# Patient Record
Sex: MALE | Race: WHITE | HISPANIC OR LATINO | URBAN - METROPOLITAN AREA
[De-identification: names, ages, dates, MRNs, and addresses within clinical notes are randomized per-mention and may not be internally consistent; named-entity substitution may affect disease eponyms.]

---

## 2022-09-12 ENCOUNTER — TELEPHONE (OUTPATIENT)
Dept: OTHER | Facility: OTHER | Age: 37
End: 2022-09-12

## 2022-09-12 NOTE — TELEPHONE ENCOUNTER
Patient is looking to cancel his appointment tomorrow  I attempted to do it, but someone has the chart open, and the system did not allow me to do so

## 2022-09-12 NOTE — TELEPHONE ENCOUNTER
Patient is looking to establish an appointment as a new patient  He would be a self pay, as he does not have insurance, but is concerned about his family history of liver cancer

## 2022-09-16 ENCOUNTER — TELEPHONE (OUTPATIENT)
Dept: GASTROENTEROLOGY | Facility: CLINIC | Age: 37
End: 2022-09-16

## 2022-09-21 ENCOUNTER — OFFICE VISIT (OUTPATIENT)
Dept: GASTROENTEROLOGY | Facility: CLINIC | Age: 37
End: 2022-09-21

## 2022-09-21 VITALS
WEIGHT: 155 LBS | SYSTOLIC BLOOD PRESSURE: 118 MMHG | BODY MASS INDEX: 24.91 KG/M2 | DIASTOLIC BLOOD PRESSURE: 80 MMHG | HEIGHT: 66 IN | TEMPERATURE: 98.8 F

## 2022-09-21 DIAGNOSIS — Z80.0 FAMILY HISTORY OF LIVER CANCER: ICD-10-CM

## 2022-09-21 DIAGNOSIS — R68.81 EARLY SATIETY: Primary | ICD-10-CM

## 2022-09-21 PROCEDURE — 99244 OFF/OP CNSLTJ NEW/EST MOD 40: CPT | Performed by: INTERNAL MEDICINE

## 2022-09-21 NOTE — ASSESSMENT & PLAN NOTE
Symptoms ongoing for the last couple of months  Fortunately, has been able to gain weight  Denies any nausea or vomiting  No diabetes history  Possible idiopathic gastroparesis  No other dyspeptic symptoms    · Check gastric emptying study  · Depending on findings, can consider upper endoscopy for further evaluation  · Follow-up in 3 months

## 2022-09-21 NOTE — ASSESSMENT & PLAN NOTE
Family history of liver cancer in his father diagnosed at the age of 59  Patient also reports paternal uncle also passed away from liver cancer  Neither of his family members were excessive drinkers  No alarming symptoms at this time apart from early satiety as noted above  Patient consumes alcohol only on occasional/seldom basis  Denies nicotine use  No clinical or laboratory stigmata of chronic liver disease    · No clear indication for screening at this time  · Had discussion with patient regarding his familial history as well as the lack of any alarming symptoms at this time; patient verbalized understanding   · Will proceed with gastric emptying study for early satiety and consideration for possible upper endoscopy depending on findings  · Can consider imaging with ultrasound of the abdomen versus CT with IV contrast in the future if patient develops any other symptoms  · Follow-up in 3 months; can discuss further with patient at that time

## 2022-09-21 NOTE — PROGRESS NOTES
[facility] - Outpatient Consultation  Linda Marcus 40 y o  male MRN: [id number]  Encounter: [id number]      ASSESSMENT & PLAN:      Problem List Items Addressed This Visit        Other    Early satiety - Primary     Symptoms ongoing for the last couple of months  Fortunately, has been able to gain weight  Denies any nausea or vomiting  No diabetes history  Possible idiopathic gastroparesis  No other dyspeptic symptoms  · Check gastric emptying study  · Depending on findings, can consider upper endoscopy for further evaluation  · Follow-up in 3 months         Relevant Orders    NM gastric emptying    Family history of liver cancer     Family history of liver cancer in his father diagnosed at the age of 59  Patient also reports paternal uncle also passed away from liver cancer  Neither of his family members were excessive drinkers  No alarming symptoms at this time apart from early satiety as noted above  Patient consumes alcohol only on occasional/seldom basis  Denies nicotine use  No clinical or laboratory stigmata of chronic liver disease  · No clear indication for screening at this time  · Had discussion with patient regarding his familial history as well as the lack of any alarming symptoms at this time; patient verbalized understanding   · Will proceed with gastric emptying study for early satiety and consideration for possible upper endoscopy depending on findings  · Can consider imaging with ultrasound of the abdomen versus CT with IV contrast in the future if patient develops any other symptoms  · Follow-up in 3 months; can discuss further with patient at that time               Orders Placed This Encounter   Procedures    NM gastric emptying     Standing Status:   Future     Standing Expiration Date:   9/21/2026     Scheduling Instructions:      THIS IS A 4 HOUR TEST    No food or drink from midnight the night before the exam  If you are diabetic and take medication please bring it with you  Please bring your insurance cards, a form of photo ID and alist of your medications with you  Arrive 15 minutes prior to your appointment time in order to register  On the day of your test, please bring any prior studies of this area with you that were not performed at a [facility]  To schedule this appointment, please contact Central Scheduling at [phone]  Order Specific Question:   Reason for Exam (FREE TEXT)     Answer:   Early satiety     Order Specific Question:   When should the test be performed? Answer:   in 1 week       Follow-up: 3 months  ______________________________________________________________________    HPI:  32-year-old male presents to GI office for reports of early satiety and a family history of liver cancer which he is concerned about  Utilized VTM [place]ian  for encounter as patient is [place]oan-[place]ian speaking  Patient reports that over the last couple of months, he has felt full very quickly after eating only small meals  Patient states any kidney just a couple of bites of something and this will cause him to be full very quickly  He denies any nausea or vomiting and states that he has gained 5 kg  He denies any other alarm symptoms including dysphagia or odynophagia, abdominal pain, hematochezia, melena, change in bowel habits  He does not have a diagnosis of diabetes  Additionally, patient reports a family history of liver cancer  He states that his father was diagnosed with liver cancer at the age of 59  He also reports a history of liver cancer in his paternal uncle as well as kidney cancer in his paternal aunt  He states that neither his uncle nor his father were excessive drinker is and had no prior diagnosis of chronic liver disease  The patient himself does drink alcohol but only seldom on occasion  He denies smoking or prior tobacco use  No other alarm symptoms      Last endoscopy: None  Last colonoscopy: None    REVIEW OF SYSTEMS:    CONSTITUTIONAL: Denies any fever, chills, rigors, and weight loss  HEENT: No earache or tinnitus, denies hearing loss or visual disturbances  CARDIOVASCULAR: No chest pain or palpitations  RESPIRATORY: Denies any cough, hemoptysis, shortness of breath or dyspnea on exertion  GASTROINTESTINAL: As noted in the History of Present Illness  GENITOURINARY: No problems with urination, denies any hematuria or dysuria  NEUROLOGIC: No dizziness or vertigo, denies headaches   MUSCULOSKELETAL: Denies any muscle or joint pain   SKIN: Denies skin rashes or itching  ENDOCRINE: Denies excessive thirst, denies intolerance to heat or cold  PSYCHOSOCIAL: Denies depression or anxiety, denies any recent memory loss     Historical Information   History reviewed  No pertinent past medical history  History reviewed  No pertinent surgical history  Social History   Social History     Substance and Sexual Activity   Alcohol Use Never     Social History     Substance and Sexual Activity   Drug Use Never     Social History     Tobacco Use   Smoking Status Never Smoker   Smokeless Tobacco Never Used     Family History   Problem Relation Age of Onset    Liver cancer Father        MEDICATIONS & ALLERGIES:    No current outpatient medications  No Known Allergies    PHYSICAL EXAM:      Objective   Blood pressure 118/80, temperature 98 8 °F (37 1 °C), temperature source Tympanic, height 5' 6" (1 676 m), weight 70 3 kg (155 lb)  Body mass index is 25 02 kg/m²  General Appearance:   Alert, cooperative, no distress   HEENT:   Normocephalic, atraumatic, anicteric     Neck:   Supple, symmetrical, trachea midline   Lungs:   Equal chest rise, respirations unlabored    Heart:   Regular rate and rhythm   Abdomen:   Soft, non-tender, non-distended; normal bowel sounds; no masses, no organomegaly    Rectal:   Deferred    Extremities:   No cyanosis, clubbing or edema    Neuro:    Moves all 4 extremities    Skin:   No jaundice, rashes, or lesions      LAB RESULTS:     No visits with results within 1 Day(s) from this visit  Latest known visit with results is:   No results found for any previous visit  RADIOLOGY RESULTS: I have personally reviewed pertinent imaging studies  JOEL Gifford  Chief Gastroenterology Fellow  [facility]  Division of Gastroenterology and Hepatology  Available on [email or web address]

## 2022-09-21 NOTE — PATIENT INSTRUCTIONS
We will check a gastric emptying study  Depending on the results, we can consider doing an endoscopy  We can also discuss getting an abdominal US or CT scan given your family history of liver cancer

## 2023-02-03 ENCOUNTER — OFFICE VISIT (OUTPATIENT)
Dept: INTERNAL MEDICINE CLINIC | Facility: CLINIC | Age: 38
End: 2023-02-03

## 2023-02-03 VITALS
TEMPERATURE: 99.3 F | OXYGEN SATURATION: 97 % | HEIGHT: 66 IN | BODY MASS INDEX: 24.59 KG/M2 | HEART RATE: 106 BPM | SYSTOLIC BLOOD PRESSURE: 128 MMHG | WEIGHT: 153 LBS | DIASTOLIC BLOOD PRESSURE: 86 MMHG

## 2023-02-03 DIAGNOSIS — K64.8 INTERNAL HEMORRHOIDS: Primary | ICD-10-CM

## 2023-02-03 NOTE — PROGRESS NOTES
Assessment/Plan:  Internal hemorrhoids       There are no diagnoses linked to this encounter  Subjective:   Rectal  pain   Patient ID: Kailey Ramirez is a 40 y o  male  HPI    The following portions of the patient's history were reviewed and updated as appropriate: allergies, current medications, past family history, past medical history, past social history, past surgical history, and problem list     Review of Systems   Constitutional: Negative  HENT: Negative for dental problem, drooling, ear discharge and ear pain  Eyes: Negative for discharge, redness and itching  Respiratory: Negative for apnea, cough and wheezing  Cardiovascular: Negative for chest pain and palpitations  Gastrointestinal: Positive for rectal pain  Negative for abdominal pain, blood in stool, diarrhea and vomiting  Endocrine: Negative for polydipsia, polyphagia and polyuria  Genitourinary: Negative for decreased urine volume, dysuria and frequency  Musculoskeletal: Negative for arthralgias, myalgias and neck stiffness  Skin: Negative for pallor and wound  Allergic/Immunologic: Negative for environmental allergies and food allergies  Neurological: Negative for facial asymmetry, light-headedness, numbness and headaches  Hematological: Negative for adenopathy  Does not bruise/bleed easily  Psychiatric/Behavioral: Negative for agitation, behavioral problems and confusion  Objective:      /86 (BP Location: Left arm, Patient Position: Sitting, Cuff Size: Large)   Pulse (!) 106   Temp 99 3 °F (37 4 °C) (Temporal)   Ht 5' 6" (1 676 m)   Wt 69 4 kg (153 lb)   SpO2 97% Comment: RA  BMI 24 69 kg/m²          Physical Exam  Constitutional:       Appearance: Normal appearance  He is obese  HENT:      Head: Normocephalic  Nose: Nose normal       Mouth/Throat:      Mouth: Mucous membranes are moist    Eyes:      Pupils: Pupils are equal, round, and reactive to light     Cardiovascular:      Rate and Rhythm: Regular rhythm  Heart sounds: Normal heart sounds  Pulmonary:      Breath sounds: Normal breath sounds  Abdominal:      Palpations: Abdomen is soft  Musculoskeletal:         General: No swelling  Cervical back: Neck supple  Skin:     General: Skin is warm  Neurological:      General: No focal deficit present  Mental Status: He is alert and oriented to person, place, and time  Psychiatric:         Mood and Affect: Mood normal        internal hemorrhoids  Internal  Hemorrhoids    Referred  To  See Rectal  And  Colon  lizet      Fup  Prferdinand Melendez MD

## 2023-03-08 ENCOUNTER — APPOINTMENT (EMERGENCY)
Dept: RADIOLOGY | Facility: HOSPITAL | Age: 38
End: 2023-03-08

## 2023-03-08 ENCOUNTER — HOSPITAL ENCOUNTER (EMERGENCY)
Facility: HOSPITAL | Age: 38
Discharge: HOME | End: 2023-03-08
Attending: EMERGENCY MEDICINE | Admitting: EMERGENCY MEDICINE

## 2023-03-08 VITALS
BODY MASS INDEX: 24.21 KG/M2 | OXYGEN SATURATION: 97 % | TEMPERATURE: 97.5 F | HEART RATE: 69 BPM | SYSTOLIC BLOOD PRESSURE: 151 MMHG | WEIGHT: 150 LBS | DIASTOLIC BLOOD PRESSURE: 106 MMHG | RESPIRATION RATE: 22 BRPM

## 2023-03-08 DIAGNOSIS — R07.9 CHEST PAIN: Primary | ICD-10-CM

## 2023-03-08 LAB
ANION GAP SERPL CALCULATED.3IONS-SCNC: 3 MMOL/L (ref 4–13)
ATRIAL RATE: 72 BPM
BASOPHILS # BLD AUTO: 0.07 THOUSANDS/ÂΜL (ref 0–0.1)
BASOPHILS NFR BLD AUTO: 1 % (ref 0–1)
BUN SERPL-MCNC: 12 MG/DL (ref 5–25)
CALCIUM SERPL-MCNC: 9.6 MG/DL (ref 8.3–10.1)
CARDIAC TROPONIN I PNL SERPL HS: 2 NG/L
CHLORIDE SERPL-SCNC: 109 MMOL/L (ref 96–108)
CO2 SERPL-SCNC: 26 MMOL/L (ref 21–32)
CREAT SERPL-MCNC: 0.87 MG/DL (ref 0.6–1.3)
EOSINOPHIL # BLD AUTO: 0.5 THOUSAND/ÂΜL (ref 0–0.61)
EOSINOPHIL NFR BLD AUTO: 7 % (ref 0–6)
ERYTHROCYTE [DISTWIDTH] IN BLOOD BY AUTOMATED COUNT: 13.3 % (ref 11.6–15.1)
GFR SERPL CREATININE-BSD FRML MDRD: 111 ML/MIN/1.73SQ M
GLUCOSE SERPL-MCNC: 124 MG/DL (ref 65–140)
HCT VFR BLD AUTO: 44.2 % (ref 36.5–49.3)
HGB BLD-MCNC: 15.5 G/DL (ref 12–17)
IMM GRANULOCYTES # BLD AUTO: 0.03 THOUSAND/UL (ref 0–0.2)
IMM GRANULOCYTES NFR BLD AUTO: 0 % (ref 0–2)
LYMPHOCYTES # BLD AUTO: 1.68 THOUSANDS/ÂΜL (ref 0.6–4.47)
LYMPHOCYTES NFR BLD AUTO: 23 % (ref 14–44)
MCH RBC QN AUTO: 28.5 PG (ref 26.8–34.3)
MCHC RBC AUTO-ENTMCNC: 35.1 G/DL (ref 31.4–37.4)
MCV RBC AUTO: 81 FL (ref 82–98)
MONOCYTES # BLD AUTO: 0.61 THOUSAND/ÂΜL (ref 0.17–1.22)
MONOCYTES NFR BLD AUTO: 8 % (ref 4–12)
NEUTROPHILS # BLD AUTO: 4.36 THOUSANDS/ÂΜL (ref 1.85–7.62)
NEUTS SEG NFR BLD AUTO: 61 % (ref 43–75)
NRBC BLD AUTO-RTO: 0 /100 WBCS
P AXIS: 55 DEGREES
PLATELET # BLD AUTO: 320 THOUSANDS/UL (ref 149–390)
PMV BLD AUTO: 9.9 FL (ref 8.9–12.7)
POTASSIUM SERPL-SCNC: 3.6 MMOL/L (ref 3.5–5.3)
PR INTERVAL: 134 MS
QRS AXIS: 65 DEGREES
QRSD INTERVAL: 94 MS
QT INTERVAL: 354 MS
QTC INTERVAL: 387 MS
RBC # BLD AUTO: 5.43 MILLION/UL (ref 3.88–5.62)
SODIUM SERPL-SCNC: 138 MMOL/L (ref 135–147)
T WAVE AXIS: 25 DEGREES
VENTRICULAR RATE: 72 BPM
WBC # BLD AUTO: 7.25 THOUSAND/UL (ref 4.31–10.16)

## 2023-03-08 RX ORDER — KETOROLAC TROMETHAMINE 30 MG/ML
15 INJECTION, SOLUTION INTRAMUSCULAR; INTRAVENOUS ONCE
Status: COMPLETED | OUTPATIENT
Start: 2023-03-08 | End: 2023-03-08

## 2023-03-08 RX ORDER — KETOROLAC TROMETHAMINE 30 MG/ML
15 INJECTION, SOLUTION INTRAMUSCULAR; INTRAVENOUS ONCE
Status: DISCONTINUED | OUTPATIENT
Start: 2023-03-08 | End: 2023-03-08

## 2023-03-08 RX ORDER — ACETAMINOPHEN 325 MG/1
650 TABLET ORAL ONCE
Status: COMPLETED | OUTPATIENT
Start: 2023-03-08 | End: 2023-03-08

## 2023-03-08 RX ADMIN — ACETAMINOPHEN 650 MG: 325 TABLET ORAL at 14:00

## 2023-03-08 RX ADMIN — KETOROLAC TROMETHAMINE 15 MG: 30 INJECTION, SOLUTION INTRAMUSCULAR; INTRAVENOUS at 14:00

## 2023-03-08 NOTE — PROGRESS NOTES
There are no diagnoses linked to this encounter  Pruritus ani  Patient presents for evaluation of perianal concerns  Patient is [place] speaking and  line is used for translation  Patient notes for the past few weeks he has had anorectal discomfort described as burning or itching  He had 1 episode of bleeding at the beginning of this  Since then he has had perianal discomfort  Examination shows perianal skin maceration and grade 1 internal hemorrhoids  Given his symptoms, I believe that is most reasonable to treat him for pruritus  I have offered topical Calmoseptine  He has had only 1 episode of bleeding and none since  Observation is reasonable for this  If he has recurrent bleeding he will call and we will schedule evaluation or colonoscopy  If persistent symptoms or new concerns he will call for reevaluation as well  HPI     Cori Galvin is here today for evaluation of hemorrhoidal symptoms  The patient reports a burning anal pain and itching and an anal lump since 2 months ago, which he notes seem to be improving over time  He notes an episode of bright red rectal bleeding with bowel movement on the toilet bowl around 2 months ago  He has 1 bowel every 2 days, varying from hard to soft and formed    The patient has no prior colonoscopy  No past medical history on file  No past surgical history on file  No current outpatient medications on file  Allergies as of 03/09/2023   • (No Known Allergies)     Review of Systems   Gastrointestinal: Positive for rectal pain  All other systems reviewed and are negative  There were no vitals filed for this visit  Physical Exam  Constitutional:       Appearance: Normal appearance  HENT:      Head: Normocephalic and atraumatic  Mouth/Throat:      Mouth: Mucous membranes are moist    Eyes:      Pupils: Pupils are equal, round, and reactive to light     Pulmonary:      Effort: Pulmonary effort is normal    Skin: General: Skin is warm and dry  Neurological:      General: No focal deficit present  Mental Status: He is alert and oriented to person, place, and time  Psychiatric:         Mood and Affect: Mood normal          Behavior: Behavior normal          Thought Content: Thought content normal          Judgment: Judgment normal      Lower Endoscopy    Date/Time: 3/9/2023 4:51 PM  Performed by: Argenis Weems MD  Authorized by: Argenis Weems MD     Verbal consent obtained?: Yes    Risks and benefits: Risks, benefits and alternatives were discussed    Consent given by:  Patient  Indications: rectal irritation    Patient sedated: No    Scope type:   Anoscope  External exam performed: Yes    Pilonidal sinus tract: No    Pilonidal cyst: No    Pilonidal tenderness: No    Perianal skin tags: No    Perirectal warts: No    Perianal maceration: Yes    Perianal induration: No    Perianal erythema: No    External hemorrhoids: No    Digital exam performed: Yes    Laxity of anal sphincter: No    Prostate tenderness: No    Internal hemorrhoids: Yes    Prolapsed: No    Intraluminal mass: No    Inflammation: No    Anal fissures: No    Anal fistulae: No    Anal stricture: No    Abscess: No    Procedure termination:  Procedure complete  Patient tolerance:  Patient tolerated the procedure well with no immediate complications

## 2023-03-08 NOTE — ED PROVIDER NOTES
History  Chief Complaint   Patient presents with   • Chest Pain     Pt c/o exertional chest pain and shortness of breath, increasing over the last week  HPI  Patient is a 50-year-old male with no past medical history presenting with left-sided chest pain  Patient states that symptoms have been ongoing for past several years however states that it has worsened since Wednesday  Pain is constant and sometimes radiates to the left arm or left neck  Patient describes the pain as 8 out of 10  Patient also complains that the pain is pleuritic  Pain also is reproduced with exertion  Patient has been taking Tylenol but nothing was taken today  Pain is also not p o  intake related  When sitting still patient complains of no shortness of breath  Denies any fever, chills, nausea, vomiting, lightheadedness, palpitations, weakness, numbness  Patient denies any smoking history and denies any IV drug use  No other associated symptoms including diaphoresis  None       No past medical history on file  No past surgical history on file  Family History   Problem Relation Age of Onset   • Liver cancer Father      I have reviewed and agree with the history as documented  E-Cigarette/Vaping     E-Cigarette/Vaping Substances     Social History     Tobacco Use   • Smoking status: Never   • Smokeless tobacco: Never   Substance Use Topics   • Alcohol use: Never   • Drug use: Never        Review of Systems   Constitutional: Negative for chills, diaphoresis, fever and unexpected weight change  HENT: Negative for ear pain and sore throat  Eyes: Negative for visual disturbance  Respiratory: Negative for cough, chest tightness and shortness of breath  Cardiovascular: Positive for chest pain  Negative for leg swelling  Gastrointestinal: Negative for abdominal distention, abdominal pain, constipation, diarrhea, nausea and vomiting  Endocrine: Negative      Genitourinary: Negative for difficulty urinating and dysuria  Musculoskeletal: Negative  Skin: Negative  Allergic/Immunologic: Negative  Neurological: Negative  Hematological: Negative  Psychiatric/Behavioral: Negative  All other systems reviewed and are negative  Physical Exam  ED Triage Vitals [03/08/23 1111]   Temperature Pulse Respirations Blood Pressure SpO2   97 5 °F (36 4 °C) 69 22 (!) 151/106 97 %      Temp Source Heart Rate Source Patient Position - Orthostatic VS BP Location FiO2 (%)   Temporal Monitor Lying Left arm --      Pain Score       8             Orthostatic Vital Signs  Vitals:    03/08/23 1111   BP: (!) 151/106   Pulse: 69   Patient Position - Orthostatic VS: Lying       Physical Exam  Vitals and nursing note reviewed  Constitutional:       General: He is not in acute distress  Appearance: Normal appearance  He is not ill-appearing  HENT:      Head: Normocephalic and atraumatic  Right Ear: External ear normal       Left Ear: External ear normal       Nose: Nose normal       Mouth/Throat:      Mouth: Mucous membranes are moist       Pharynx: Oropharynx is clear  Eyes:      General: No scleral icterus  Right eye: No discharge  Left eye: No discharge  Extraocular Movements: Extraocular movements intact  Conjunctiva/sclera: Conjunctivae normal       Pupils: Pupils are equal, round, and reactive to light  Cardiovascular:      Rate and Rhythm: Normal rate and regular rhythm  Pulses: Normal pulses  Heart sounds: Normal heart sounds  Pulmonary:      Effort: Pulmonary effort is normal       Breath sounds: Normal breath sounds  Abdominal:      General: Abdomen is flat  Bowel sounds are normal  There is no distension  Palpations: Abdomen is soft  Tenderness: There is no abdominal tenderness  There is no guarding or rebound  Musculoskeletal:         General: Normal range of motion  Cervical back: Normal range of motion and neck supple     Skin:     General: Skin is warm and dry  Capillary Refill: Capillary refill takes less than 2 seconds  Neurological:      General: No focal deficit present  Mental Status: He is alert and oriented to person, place, and time  Mental status is at baseline  Psychiatric:         Mood and Affect: Mood normal          Behavior: Behavior normal          Thought Content:  Thought content normal          Judgment: Judgment normal          ED Medications  Medications   acetaminophen (TYLENOL) tablet 650 mg (650 mg Oral Given 3/8/23 1400)   ketorolac (TORADOL) injection 15 mg (15 mg Intravenous Given 3/8/23 1400)       Diagnostic Studies  Results Reviewed     Procedure Component Value Units Date/Time    HS Troponin 0hr (reflex protocol) [[id number]]  (Normal) Collected: 03/08/23 1221    Lab Status: Final result Specimen: Blood from Arm, Left Updated: 03/08/23 1327     hs TnI 0hr 2 ng/L     Basic metabolic panel [[id number]]  (Abnormal) Collected: 03/08/23 1221    Lab Status: Final result Specimen: Blood from Arm, Left Updated: 03/08/23 1251     Sodium 138 mmol/L      Potassium 3 6 mmol/L      Chloride 109 mmol/L      CO2 26 mmol/L      ANION GAP 3 mmol/L      BUN 12 mg/dL      Creatinine 0 87 mg/dL      Glucose 124 mg/dL      Calcium 9 6 mg/dL      eGFR 111 ml/min/1 73sq m     Narrative:      [place] guidelines for Chronic Kidney Disease (CKD):   •  Stage 1 with normal or high GFR (GFR > 90 mL/min/1 73 square meters)  •  Stage 2 Mild CKD (GFR = 60-89 mL/min/1 73 square meters)  •  Stage 3A Moderate CKD (GFR = 45-59 mL/min/1 73 square meters)  •  Stage 3B Moderate CKD (GFR = 30-44 mL/min/1 73 square meters)  •  Stage 4 Severe CKD (GFR = 15-29 mL/min/1 73 square meters)  •  Stage 5 End Stage CKD (GFR <15 mL/min/1 73 square meters)  Note: GFR calculation is accurate only with a steady state creatinine    CBC and differential [[id number]]  (Abnormal) Collected: 03/08/23 1221    Lab Status: Final result Specimen: Blood from Arm, Left Updated: 03/08/23 1232     WBC 7 25 Thousand/uL      RBC 5 43 Million/uL      Hemoglobin 15 5 g/dL      Hematocrit 44 2 %      MCV 81 fL      MCH 28 5 pg      MCHC 35 1 g/dL      RDW 13 3 %      MPV 9 9 fL      Platelets 575 Thousands/uL      nRBC 0 /100 WBCs      Neutrophils Relative 61 %      Immat GRANS % 0 %      Lymphocytes Relative 23 %      Monocytes Relative 8 %      Eosinophils Relative 7 %      Basophils Relative 1 %      Neutrophils Absolute 4 36 Thousands/µL      Immature Grans Absolute 0 03 Thousand/uL      Lymphocytes Absolute 1 68 Thousands/µL      Monocytes Absolute 0 61 Thousand/µL      Eosinophils Absolute 0 50 Thousand/µL      Basophils Absolute 0 07 Thousands/µL                  XR chest 1 view portable   Final Result by Trini Mayorga MD (03/08 1420)      No acute cardiopulmonary disease  Findings are stable            Workstation performed: [id number]               Procedures  Procedures      ED Course             HEART Risk Score    Flowsheet Row Most Recent Value   Heart Score Risk Calculator    History 0 Filed at: 03/08/2023 1349   ECG 0 Filed at: 03/08/2023 1349   Age 0 Filed at: 03/08/2023 1349   Risk Factors 0 Filed at: 03/08/2023 1349   Troponin 0 Filed at: 03/08/2023 1349   HEART Score 0 Filed at: 03/08/2023 1349                      SBIRT 22yo+    Flowsheet Row Most Recent Value   SBIRT (23 yo +)    In order to provide better care to our patients, we are screening all of our patients for alcohol and drug use  Would it be okay to ask you these screening questions? No Filed at: 03/08/2023 1122                Medical Decision Making  Patient is a 15-year-old male presenting with chest pain  Differential includes pneumothorax, pneumonia, ACS, GERD, costochondritis  We will obtain cardiac work-up  Cardiac work-up was negative    X-ray with no cardiopulmonary disease noted  Patient discharged with instructions to establish care with Dr James Marcos  Return precautions provided    Amount and/or Complexity of Data Reviewed  Labs: ordered  Radiology: ordered  Risk  OTC drugs  Prescription drug management  Disposition  Final diagnoses:   Chest pain     Time reflects when diagnosis was documented in both MDM as applicable and the Disposition within this note     Time User Action Codes Description Comment    3/8/2023  1:49 PM Iliana Lei Add [R07 9] Chest pain       ED Disposition     ED Disposition   Discharge    Condition   Stable    Date/Time   Wed Mar 8, 2023  1:47 PM    Comment   [address] discharge to home/self care  Follow-up Information     Follow up With Specialties Details Why Contact Info Additional Information    Richard Yeh MD Internal Medicine Schedule an appointment as soon as possible for a visit   [address] [facility]  [address] [facility] Emergency Department Emergency Medicine Go to  If symptoms worsen [address] Emergency Department, [address] [place] [address], [id number] [phone]          There are no discharge medications for this patient  No discharge procedures on file  PDMP Review     None           ED Provider  Attending physically available and evaluated [address]  I managed the patient along with the ED Attending      Electronically Signed by         Rosaura Bear MD  03/08/23 2013

## 2023-03-08 NOTE — ED ATTENDING ATTESTATION
3/8/2023  IMiguelangel DO, saw and evaluated the patient  I have discussed the patient with the resident/non-physician practitioner and agree with the resident's/non-physician practitioner's findings, Plan of Care, and MDM as documented in the resident's/non-physician practitioner's note, except where noted  All available labs and Radiology studies were reviewed  I was present for key portions of any procedure(s) performed by the resident/non-physician practitioner and I was immediately available to provide assistance  At this point I agree with the current assessment done in the Emergency Department  I have conducted an independent evaluation of this patient a history and physical is as follows:    47yo male presents with chest pain that started several years ago but worse over the past 1 week  Denies SOB, no nausea, no diaphoresis  No known medical problems  On exam - nad, heart reg, lungs clear    Plan - check cardiac labs and reassess    ED Course         Critical Care Time  Procedures

## 2023-03-09 ENCOUNTER — OFFICE VISIT (OUTPATIENT)
Age: 38
End: 2023-03-09

## 2023-03-09 VITALS — HEIGHT: 66 IN | BODY MASS INDEX: 24.11 KG/M2 | WEIGHT: 150 LBS

## 2023-03-09 DIAGNOSIS — L29.0 PRURITUS ANI: Primary | ICD-10-CM

## 2023-03-09 NOTE — ASSESSMENT & PLAN NOTE
Patient presents for evaluation of perianal concerns  Patient is [place] speaking and  line is used for translation  Patient notes for the past few weeks he has had anorectal discomfort described as burning or itching  He had 1 episode of bleeding at the beginning of this  Since then he has had perianal discomfort  Examination shows perianal skin maceration and grade 1 internal hemorrhoids  Given his symptoms, I believe that is most reasonable to treat him for pruritus  I have offered topical Calmoseptine  He has had only 1 episode of bleeding and none since  Observation is reasonable for this  If he has recurrent bleeding he will call and we will schedule evaluation or colonoscopy  If persistent symptoms or new concerns he will call for reevaluation as well

## 2023-09-26 ENCOUNTER — TELEPHONE (OUTPATIENT)
Age: 38
End: 2023-09-26

## 2023-09-26 NOTE — TELEPHONE ENCOUNTER
Left patient voicemail with estimate for upcoming appointment with Dr. Lydia Estrada. Patient is self pay.

## 2024-06-27 ENCOUNTER — HOSPITAL ENCOUNTER (EMERGENCY)
Facility: HOSPITAL | Age: 39
Discharge: HOME | End: 2024-06-27
Attending: EMERGENCY MEDICINE

## 2024-06-27 ENCOUNTER — APPOINTMENT (EMERGENCY)
Dept: RADIOLOGY | Facility: HOSPITAL | Age: 39
End: 2024-06-27

## 2024-06-27 VITALS
HEART RATE: 61 BPM | RESPIRATION RATE: 16 BRPM | OXYGEN SATURATION: 99 % | WEIGHT: 159 LBS | TEMPERATURE: 99 F | SYSTOLIC BLOOD PRESSURE: 123 MMHG | BODY MASS INDEX: 25.66 KG/M2 | DIASTOLIC BLOOD PRESSURE: 58 MMHG

## 2024-06-27 DIAGNOSIS — R10.9 ABDOMINAL PAIN: ICD-10-CM

## 2024-06-27 DIAGNOSIS — K59.00 CONSTIPATION: Primary | ICD-10-CM

## 2024-06-27 LAB
ALBUMIN SERPL BCG-MCNC: 4.7 G/DL (ref 3.5–5)
ALP SERPL-CCNC: 93 U/L (ref 34–104)
ALT SERPL W P-5'-P-CCNC: 17 U/L (ref 7–52)
ANION GAP SERPL CALCULATED.3IONS-SCNC: 12 MMOL/L (ref 4–13)
AST SERPL W P-5'-P-CCNC: 22 U/L (ref 13–39)
ATRIAL RATE: 62 BPM
BASOPHILS # BLD AUTO: 0.1 THOUSANDS/ÂΜL (ref 0–0.1)
BASOPHILS NFR BLD AUTO: 1 % (ref 0–1)
BILIRUB SERPL-MCNC: 0.6 MG/DL (ref 0.2–1)
BUN SERPL-MCNC: 21 MG/DL (ref 5–25)
CALCIUM SERPL-MCNC: 10 MG/DL (ref 8.4–10.2)
CHLORIDE SERPL-SCNC: 102 MMOL/L (ref 96–108)
CO2 SERPL-SCNC: 27 MMOL/L (ref 21–32)
CREAT SERPL-MCNC: 1.19 MG/DL (ref 0.6–1.3)
EOSINOPHIL # BLD AUTO: 1.31 THOUSAND/ÂΜL (ref 0–0.61)
EOSINOPHIL NFR BLD AUTO: 14 % (ref 0–6)
ERYTHROCYTE [DISTWIDTH] IN BLOOD BY AUTOMATED COUNT: 13.2 % (ref 11.6–15.1)
GFR SERPL CREATININE-BSD FRML MDRD: 77 ML/MIN/1.73SQ M
GLUCOSE SERPL-MCNC: 89 MG/DL (ref 65–140)
HCT VFR BLD AUTO: 44.3 % (ref 36.5–49.3)
HGB BLD-MCNC: 15.7 G/DL (ref 12–17)
IMM GRANULOCYTES # BLD AUTO: 0.05 THOUSAND/UL (ref 0–0.2)
IMM GRANULOCYTES NFR BLD AUTO: 1 % (ref 0–2)
LIPASE SERPL-CCNC: 31 U/L (ref 11–82)
LYMPHOCYTES # BLD AUTO: 2.68 THOUSANDS/ÂΜL (ref 0.6–4.47)
LYMPHOCYTES NFR BLD AUTO: 29 % (ref 14–44)
MCH RBC QN AUTO: 29.6 PG (ref 26.8–34.3)
MCHC RBC AUTO-ENTMCNC: 35.4 G/DL (ref 31.4–37.4)
MCV RBC AUTO: 84 FL (ref 82–98)
MONOCYTES # BLD AUTO: 0.65 THOUSAND/ÂΜL (ref 0.17–1.22)
MONOCYTES NFR BLD AUTO: 7 % (ref 4–12)
NEUTROPHILS # BLD AUTO: 4.35 THOUSANDS/ÂΜL (ref 1.85–7.62)
NEUTS SEG NFR BLD AUTO: 48 % (ref 43–75)
NRBC BLD AUTO-RTO: 0 /100 WBCS
P AXIS: 62 DEGREES
PLATELET # BLD AUTO: 285 THOUSANDS/UL (ref 149–390)
PMV BLD AUTO: 10.2 FL (ref 8.9–12.7)
POTASSIUM SERPL-SCNC: 3.9 MMOL/L (ref 3.5–5.3)
PR INTERVAL: 132 MS
PROT SERPL-MCNC: 7.5 G/DL (ref 6.4–8.4)
QRS AXIS: 89 DEGREES
QRSD INTERVAL: 92 MS
QT INTERVAL: 378 MS
QTC INTERVAL: 383 MS
RBC # BLD AUTO: 5.3 MILLION/UL (ref 3.88–5.62)
SODIUM SERPL-SCNC: 141 MMOL/L (ref 135–147)
T WAVE AXIS: 34 DEGREES
VENTRICULAR RATE: 62 BPM
WBC # BLD AUTO: 9.14 THOUSAND/UL (ref 4.31–10.16)

## 2024-06-27 PROCEDURE — 96375 TX/PRO/DX INJ NEW DRUG ADDON: CPT

## 2024-06-27 PROCEDURE — 96361 HYDRATE IV INFUSION ADD-ON: CPT

## 2024-06-27 PROCEDURE — 96374 THER/PROPH/DIAG INJ IV PUSH: CPT

## 2024-06-27 PROCEDURE — 93010 ELECTROCARDIOGRAM REPORT: CPT | Performed by: INTERNAL MEDICINE

## 2024-06-27 PROCEDURE — 80053 COMPREHEN METABOLIC PANEL: CPT | Performed by: EMERGENCY MEDICINE

## 2024-06-27 PROCEDURE — 99284 EMERGENCY DEPT VISIT MOD MDM: CPT

## 2024-06-27 PROCEDURE — 74177 CT ABD & PELVIS W/CONTRAST: CPT

## 2024-06-27 PROCEDURE — 85025 COMPLETE CBC W/AUTO DIFF WBC: CPT | Performed by: EMERGENCY MEDICINE

## 2024-06-27 PROCEDURE — 36415 COLL VENOUS BLD VENIPUNCTURE: CPT | Performed by: EMERGENCY MEDICINE

## 2024-06-27 PROCEDURE — 93005 ELECTROCARDIOGRAM TRACING: CPT

## 2024-06-27 PROCEDURE — 99285 EMERGENCY DEPT VISIT HI MDM: CPT | Performed by: EMERGENCY MEDICINE

## 2024-06-27 PROCEDURE — 83690 ASSAY OF LIPASE: CPT | Performed by: EMERGENCY MEDICINE

## 2024-06-27 RX ORDER — FAMOTIDINE 10 MG/ML
20 INJECTION, SOLUTION INTRAVENOUS ONCE
Status: COMPLETED | OUTPATIENT
Start: 2024-06-27 | End: 2024-06-27

## 2024-06-27 RX ORDER — KETOROLAC TROMETHAMINE 30 MG/ML
30 INJECTION, SOLUTION INTRAMUSCULAR; INTRAVENOUS ONCE
Status: COMPLETED | OUTPATIENT
Start: 2024-06-27 | End: 2024-06-27

## 2024-06-27 RX ORDER — MAGNESIUM HYDROXIDE/ALUMINUM HYDROXICE/SIMETHICONE 120; 1200; 1200 MG/30ML; MG/30ML; MG/30ML
30 SUSPENSION ORAL ONCE
Status: COMPLETED | OUTPATIENT
Start: 2024-06-27 | End: 2024-06-27

## 2024-06-27 RX ADMIN — SODIUM CHLORIDE 1000 ML: 0.9 INJECTION, SOLUTION INTRAVENOUS at 16:27

## 2024-06-27 RX ADMIN — KETOROLAC TROMETHAMINE 30 MG: 30 INJECTION, SOLUTION INTRAMUSCULAR at 16:20

## 2024-06-27 RX ADMIN — FAMOTIDINE 20 MG: 10 INJECTION INTRAVENOUS at 16:21

## 2024-06-27 RX ADMIN — ALUMINUM HYDROXIDE, MAGNESIUM HYDROXIDE, DIMETHICONE 30 ML: 200; 200; 20 LIQUID ORAL at 16:24

## 2024-06-27 RX ADMIN — IOHEXOL 85 ML: 350 INJECTION, SOLUTION INTRAVENOUS at 18:38

## 2024-06-27 NOTE — DISCHARGE INSTRUCTIONS
Please return to the emergency department if you develop new or worsening symptoms including fever, chest pain, shortness of breath, nausea and vomiting that does not resolve on its own, worsening abdominal pain.    Please follow-up with your primary care provider for additional care and management of your constipation. Please make an appointment w/ the GI provider to further manage your abdominal pain symptoms.    Please continue to take your home medications as prescribed, please take Miralax daily as needed and add Colace or Senna / Sonokot over the counter stool softer to your home medication regimen.

## 2024-06-27 NOTE — ED ATTENDING ATTESTATION
"I, Bebeto Hatch MD, saw and evaluated the patient. I have discussed the patient with the resident and agree with the resident's findings, Plan of Care, and MDM as documented in the resident's note, except where noted. All available labs and Radiology studies were reviewed.  I was present for key portions of any procedure(s) performed by the resident and I was immediately available to provide assistance.    At this point I agree with the current assessment done in the Emergency Department.  I have conducted an independent evaluation of this patient a history and physical is as follows:    37 yo male with a history of GERD presents to the ED complaining of intermittent abdominal pain x \"months\". The patient reports frequent pain in the epigastrium and occasional left lower abdominal pain with a palpable \"lump\". (+) Abdominal bloating after large meals. No rib pain despite triage documentation. He denies nausea, vomiting, and diarrhea. No fevers or chills. No testicle pain or swelling. The patient reports a family history of abdominal cancer and says he wants to know \"what's going on\". No back pain, flank pain, dysuria, or hematuria. He denies chest pain, shortness of breath, and diaphoresis. No black or bloody stools. No other specific complaints.    ROS: per resident physician note    Gen: NAD, AA&Ox3  HEENT: PERRL, EOMI  Neck: supple  CV: RRR  Lungs: CTA B/L  Abdomen: soft, (+) moderate epigastric and LLQ tenderness, no appreciable mass or hernia  Ext: no swelling or deformity  Neuro: 5/5 strength all extremities, sensation grossly intact  Skin: no rash    ED Course  The patient is comfortable appearing with stable vital signs. (+) Moderate epigastric and LLQ tenderness on exam. Pain has been intermittent x several months. Unclear etiology of symptoms. Gastritis vs gallbladder disease vs pancreatitis vs diverticulitis vs abdominal wall hernia? Will check basic labs, lipase, and CT A/P. IVFs, Toradol, Pepcid, " and Maalox ordered. Will continue to monitor in the ED. Disposition per workup and reassessment.      Critical Care Time  Procedures

## 2024-06-27 NOTE — ED PROVIDER NOTES
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  Patient is reporting that for the last several months he has had abdominal discomfort.  Reporting that he has right upper quadrant abdominal discomfort after eating, sometimes feels bloated, no vomiting nausea or diarrhea, but recently he is also having lower quadrant abdominal pain, states that his left lower quadrant is the one that is most uncomfortable at this time, states he has a burning sensation in the left lower quadrant, states that there is no position or activity that provokes this pain, states no recent traumatic event or falls, no history of bloody stools, the pain is waxing and waning, worsening over the last 3 days, states that he feels a bump or lump when he sits up and palpates deeply into his left abdomen/stomach.  States he has a regular diet, is not sexually active, no history of sexually transmitted illness, no use of alcohol or drugs, at home he takes omeprazole but feels it does not help significantly with his reflux, no other chronic medications at home at this time.      None       History reviewed. No pertinent past medical history.    History reviewed. No pertinent surgical history.    Family History   Problem Relation Age of Onset   • Liver cancer Father      I have reviewed and agree with the history as documented.    E-Cigarette/Vaping   • E-Cigarette Use Never Assessed      E-Cigarette/Vaping Substances   • Nicotine No    • THC No    • CBD No    • Flavoring No    • Other No    • Unknown No      Social History     Tobacco Use   • Smoking status: Never   • Smokeless tobacco: Never   Substance Use Topics   • Alcohol use: Never   • Drug use: " Never        Review of Systems    Physical Exam  ED Triage Vitals   Temperature Pulse Respirations Blood Pressure SpO2   06/27/24 1221 06/27/24 1218 06/27/24 1218 06/27/24 1218 06/27/24 1218   99 °F (37.2 °C) 70 18 137/98 97 %      Temp Source Heart Rate Source Patient Position - Orthostatic VS BP Location FiO2 (%)   06/27/24 1221 06/27/24 1218 06/27/24 1218 06/27/24 1218 --   Oral Monitor Sitting Right arm       Pain Score       --                    Orthostatic Vital Signs  Vitals:    06/27/24 1218 06/27/24 1350 06/27/24 1630   BP: 137/98 121/78 131/89   Pulse: 70 60 56   Patient Position - Orthostatic VS: Sitting         Physical Exam    ED Medications  Medications   sodium chloride 0.9 % bolus 1,000 mL (1,000 mL Intravenous New Bag 6/27/24 1627)   ketorolac (TORADOL) injection 30 mg (30 mg Intravenous Given 6/27/24 1620)   aluminum-magnesium hydroxide-simethicone (MAALOX) oral suspension 30 mL (30 mL Oral Given 6/27/24 1624)   Famotidine (PF) (PEPCID) injection 20 mg (20 mg Intravenous Given 6/27/24 1621)       Diagnostic Studies  Results Reviewed       Procedure Component Value Units Date/Time    CBC and differential [[id number]]  (Abnormal) Collected: 06/27/24 1623    Lab Status: Final result Specimen: Blood from Arm, Left Updated: 06/27/24 1639     WBC 9.14 Thousand/uL      RBC 5.30 Million/uL      Hemoglobin 15.7 g/dL      Hematocrit 44.3 %      MCV 84 fL      MCH 29.6 pg      MCHC 35.4 g/dL      RDW 13.2 %      MPV 10.2 fL      Platelets 285 Thousands/uL      nRBC 0 /100 WBCs      Segmented % 48 %      Immature Grans % 1 %      Lymphocytes % 29 %      Monocytes % 7 %      Eosinophils Relative 14 %      Basophils Relative 1 %      Absolute Neutrophils 4.35 Thousands/µL      Absolute Immature Grans 0.05 Thousand/uL      Absolute Lymphocytes 2.68 Thousands/µL      Absolute Monocytes 0.65 Thousand/µL      Eosinophils Absolute 1.31 Thousand/µL      Basophils Absolute 0.10 Thousands/µL     Comprehensive metabolic  panel [[id number]] Collected: 06/27/24 1623    Lab Status: In process Specimen: Blood from Arm, Left Updated: 06/27/24 1633    Lipase [[id number]] Collected: 06/27/24 1623    Lab Status: In process Specimen: Blood from Arm, Left Updated: 06/27/24 1633                   CT abdomen pelvis with contrast    (Results Pending)         Procedures  Procedures      ED Course                                       Medical Decision Making  Amount and/or Complexity of Data Reviewed  Labs: ordered.  Radiology: ordered.    Risk  OTC drugs.  Prescription drug management.        Disposition  Final diagnoses:   None     ED Disposition       None          Follow-up Information    None         Patient's Medications    No medications on file     No discharge procedures on file.    PDMP Review       None             ED Provider  Attending physically available and evaluated Navarro Marcos. I managed the patient along with the ED Attending.    Electronically Signed by         RBC 5.30 Million/uL      Hemoglobin 15.7 g/dL      Hematocrit 44.3 %      MCV 84 fL      MCH 29.6 pg      MCHC 35.4 g/dL      RDW 13.2 %      MPV 10.2 fL      Platelets 285 Thousands/uL      nRBC 0 /100 WBCs      Segmented % 48 %      Immature Grans % 1 %      Lymphocytes % 29 %      Monocytes % 7 %      Eosinophils Relative 14 %      Basophils Relative 1 %      Absolute Neutrophils 4.35 Thousands/µL      Absolute Immature Grans 0.05 Thousand/uL      Absolute Lymphocytes 2.68 Thousands/µL      Absolute Monocytes 0.65 Thousand/µL      Eosinophils Absolute 1.31 Thousand/µL      Basophils Absolute 0.10 Thousands/µL                    CT abdomen pelvis with contrast   Final Result by Yakelin Howe MD (06/27 1946)      No acute inflammatory process in the abdomen or pelvis.         Workstation performed: [id number]               Procedures  Procedures      ED Course  ED Course as of 07/05/24 0138   Thu Jun 27, 2024   1740 CBC within normal limits                                       Medical Decision Making  Vital signs on arrival within normal limits.    History and physical exam most consistent with gastritis. However, differential diagnosis included but not limited to pancreatitis, neoplasm, appendicitis, biliary disease, cholelithiasis/cholecystitis, IBS, constipation. Plan discussion with the patient, he would like to have imaging performed at this time, has a family member with history of stomach cancer, is very anxious/concerned about this, additionally we will perform a CBC/CMP/lipase, EKG, symptomatic treatment with Toradol/Pepcid/Maalox, 1 L bolus of saline.    View ED course above for further discussion on patient workup.     Laboratory evaluation returns within normal limits, ECG demonstrates no features concerning for arrhythmia or ACS, CT abdomen pelvis demonstrates no acute inflammatory process in the abdomen or pelvis    All labs reviewed and utilized in the medical decision making process  All  radiology studies independently viewed by me and interpreted by the radiologist.  I reviewed all testing with the patient.     Upon re-evaluation patient has no new symptom or complaint, achieved pain control with administration of Toradol/Pepcid/Maalox and 1 L bolus of saline, it was noted on the patient's CT scan that he has a large amount of stool burden, had a discussion with the patient about beginning regular use of MiraLAX and as needed use of a stool softener, provided recommendations for over-the-counter medications to achieve this end, PCP follow-up recommended, gastro enterology follow-up recommended, return precautions given    Amount and/or Complexity of Data Reviewed  Labs: ordered.  Radiology: ordered.    Risk  OTC drugs.  Prescription drug management.          Disposition  Final diagnoses:   Constipation   Abdominal pain     Time reflects when diagnosis was documented in both MDM as applicable and the Disposition within this note       Time User Action Codes Description Comment    6/27/2024  7:54 PM Konrad Chavira Add [K59.00] Constipation     6/27/2024  7:54 PM Konrad Chavira Add [R10.9] Abdominal pain           ED Disposition       ED Disposition   Discharge    Condition   Stable    Date/Time   Thu Jun 27, 2024  7:50 PM    Comment   Navarro Marcos discharge to home/self care.                   Follow-up Information       Follow up With Specialties Details Why Contact Info Additional Information    [facility] G[facility] Schedule an appointment as soon as possible for a visit   [address] [facility] [address] [phone]     [facility] Emergency Medicine Go to  If symptoms worsen [address] [facility] [address] [phone] [facility] Emergency Department, [address], [place], [address] [phone]    Asad Muñoz MD Internal Medicine Schedule an  appointment as soon as possible for a visit in 1 week  [address] [facility]  [place] [address]  [phone]               There are no discharge medications for this patient.    No discharge procedures on file.    PDMP Review       None             ED Provider  Attending physically available and evaluated Navarro Marcos. I managed the patient along with the ED Attending.    Electronically Signed by           Konrad Chavira DO  07/05/24 0138

## 2025-06-17 ENCOUNTER — TELEPHONE (OUTPATIENT)
Dept: INTERNAL MEDICINE CLINIC | Facility: CLINIC | Age: 40
End: 2025-06-17

## 2025-06-17 NOTE — TELEPHONE ENCOUNTER
Left message for patient to call back the office pt is due for a physical has not been seen In  a while